# Patient Record
Sex: MALE | Race: OTHER | Employment: UNEMPLOYED | URBAN - NONMETROPOLITAN AREA
[De-identification: names, ages, dates, MRNs, and addresses within clinical notes are randomized per-mention and may not be internally consistent; named-entity substitution may affect disease eponyms.]

---
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
## 2022-07-14 ENCOUNTER — OFFICE VISIT (OUTPATIENT)
Dept: PRIMARY CARE CLINIC | Age: 22
End: 2022-07-14
Payer: COMMERCIAL

## 2022-07-14 ENCOUNTER — HOSPITAL ENCOUNTER (OUTPATIENT)
Dept: LAB | Age: 22
Discharge: HOME | End: 2022-07-14
Payer: COMMERCIAL

## 2022-07-14 VITALS
DIASTOLIC BLOOD PRESSURE: 64 MMHG | WEIGHT: 129 LBS | OXYGEN SATURATION: 98 % | TEMPERATURE: 98.2 F | HEART RATE: 74 BPM | SYSTOLIC BLOOD PRESSURE: 110 MMHG

## 2022-07-14 DIAGNOSIS — R19.7 DIARRHEA, UNSPECIFIED TYPE: ICD-10-CM

## 2022-07-14 DIAGNOSIS — R10.30 LOWER ABDOMINAL PAIN: Primary | ICD-10-CM

## 2022-07-14 DIAGNOSIS — R10.30 LOWER ABDOMINAL PAIN: ICD-10-CM

## 2022-07-14 LAB
-: ABNORMAL
ABSOLUTE EOS #: 0.07 K/UL (ref 0–0.44)
ABSOLUTE IMMATURE GRANULOCYTE: <0.03 K/UL (ref 0–0.3)
ABSOLUTE LYMPH #: 1.81 K/UL (ref 1.2–5.2)
ABSOLUTE MONO #: 0.69 K/UL (ref 0.1–1.4)
ALBUMIN SERPL-MCNC: 4.8 G/DL (ref 3.5–5.2)
ALBUMIN/GLOBULIN RATIO: 1.5 (ref 1–2.5)
ALP BLD-CCNC: 64 U/L (ref 40–129)
ALT SERPL-CCNC: 9 U/L (ref 5–41)
ANION GAP SERPL CALCULATED.3IONS-SCNC: 11 MMOL/L (ref 9–17)
AST SERPL-CCNC: 20 U/L
BACTERIA: ABNORMAL
BASOPHILS # BLD: 0 % (ref 0–2)
BASOPHILS ABSOLUTE: 0.03 K/UL (ref 0–0.2)
BILIRUB SERPL-MCNC: 0.76 MG/DL (ref 0.3–1.2)
BILIRUBIN URINE: NEGATIVE
BUN BLDV-MCNC: 12 MG/DL (ref 6–20)
BUN/CREAT BLD: 15 (ref 9–20)
CALCIUM SERPL-MCNC: 10 MG/DL (ref 8.6–10.4)
CHLORIDE BLD-SCNC: 99 MMOL/L (ref 98–107)
CO2: 27 MMOL/L (ref 20–31)
CREAT SERPL-MCNC: 0.81 MG/DL (ref 0.7–1.2)
EOSINOPHILS RELATIVE PERCENT: 1 % (ref 1–4)
EPITHELIAL CELLS UA: ABNORMAL /HPF (ref 0–5)
GFR AFRICAN AMERICAN: >60 ML/MIN
GFR NON-AFRICAN AMERICAN: >60 ML/MIN
GFR SERPL CREATININE-BSD FRML MDRD: ABNORMAL ML/MIN/{1.73_M2}
GLUCOSE BLD-MCNC: 106 MG/DL (ref 70–99)
GLUCOSE URINE: NEGATIVE
HCT VFR BLD CALC: 47.8 % (ref 40.7–50.3)
HEMOGLOBIN: 15.7 G/DL (ref 13–17)
IMMATURE GRANULOCYTES: 0 %
KETONES, URINE: NEGATIVE
LEUKOCYTE ESTERASE, URINE: NEGATIVE
LYMPHOCYTES # BLD: 22 % (ref 25–45)
MCH RBC QN AUTO: 27.7 PG (ref 25.2–33.5)
MCHC RBC AUTO-ENTMCNC: 32.8 G/DL (ref 25.2–33.5)
MCV RBC AUTO: 84.3 FL (ref 82.6–102.9)
MONOCYTES # BLD: 8 % (ref 2–8)
MUCUS: ABNORMAL
NITRITE, URINE: NEGATIVE
NRBC AUTOMATED: 0 PER 100 WBC
PDW BLD-RTO: 13.1 % (ref 11.8–14.4)
PH UA: 7 (ref 5–6)
PLATELET # BLD: 233 K/UL (ref 138–453)
PMV BLD AUTO: 9.9 FL (ref 8.1–13.5)
POTASSIUM SERPL-SCNC: 4 MMOL/L (ref 3.7–5.3)
PROTEIN UA: NEGATIVE
RBC # BLD: 5.67 M/UL (ref 4.21–5.77)
RBC UA: ABNORMAL /HPF (ref 0–4)
SEG NEUTROPHILS: 69 % (ref 34–64)
SEGMENTED NEUTROPHILS ABSOLUTE COUNT: 5.81 K/UL (ref 1.8–8)
SODIUM BLD-SCNC: 137 MMOL/L (ref 135–144)
SPECIFIC GRAVITY UA: 1.02 (ref 1.01–1.02)
TOTAL PROTEIN: 8.1 G/DL (ref 6.4–8.3)
URINE HGB: NEGATIVE
UROBILINOGEN, URINE: NORMAL
WBC # BLD: 8.4 K/UL (ref 4.5–13.5)
WBC UA: ABNORMAL /HPF (ref 0–4)

## 2022-07-14 PROCEDURE — 81001 URINALYSIS AUTO W/SCOPE: CPT

## 2022-07-14 PROCEDURE — 99203 OFFICE O/P NEW LOW 30 MIN: CPT | Performed by: FAMILY MEDICINE

## 2022-07-14 PROCEDURE — 99204 OFFICE O/P NEW MOD 45 MIN: CPT | Performed by: FAMILY MEDICINE

## 2022-07-14 PROCEDURE — 36415 COLL VENOUS BLD VENIPUNCTURE: CPT

## 2022-07-14 PROCEDURE — G8427 DOCREV CUR MEDS BY ELIG CLIN: HCPCS | Performed by: FAMILY MEDICINE

## 2022-07-14 PROCEDURE — 85025 COMPLETE CBC W/AUTO DIFF WBC: CPT

## 2022-07-14 PROCEDURE — 80053 COMPREHEN METABOLIC PANEL: CPT

## 2022-07-14 PROCEDURE — G8421 BMI NOT CALCULATED: HCPCS | Performed by: FAMILY MEDICINE

## 2022-07-14 PROCEDURE — 1036F TOBACCO NON-USER: CPT | Performed by: FAMILY MEDICINE

## 2022-07-14 ASSESSMENT — PATIENT HEALTH QUESTIONNAIRE - PHQ9
SUM OF ALL RESPONSES TO PHQ QUESTIONS 1-9: 0
2. FEELING DOWN, DEPRESSED OR HOPELESS: 0
SUM OF ALL RESPONSES TO PHQ QUESTIONS 1-9: 0
1. LITTLE INTEREST OR PLEASURE IN DOING THINGS: 0
SUM OF ALL RESPONSES TO PHQ QUESTIONS 1-9: 0
SUM OF ALL RESPONSES TO PHQ QUESTIONS 1-9: 0
SUM OF ALL RESPONSES TO PHQ9 QUESTIONS 1 & 2: 0

## 2022-07-14 NOTE — PROGRESS NOTES
[facility]             [address] [facility], [address]                        Telephone [phone]             Fax [phone]       Chace Watt  :  2000  Age:  22 y.o. MRN:  [id number]  Date of visit:  2022       Assessment and Plan:    1. Lower abdominal pain  2. Diarrhea, unspecified type  I reviewed the results of the labs done today were reviewed with the patient.    - Gastrointestinal Panel, Molecular; Future was also ordered. He was also referred to general surgery:  - Ambulatory referral to General Surgery      Printed information regarding Abdominal Pain was provided to the patient with the after visit summary. He was advised to drink plenty of fluids and eat a bland diet. He was advised to follow up if symptoms worsen or do not resolve. Subjective:    Chace Watt is a 22 y.o. male who presents to [facility] today (2022) for evaluation of:  Abdominal Pain (off and on for 1 1/2 months nausea vomitting diarrhea with it)      He reports that he has had intermittent episodes of pain in the lower abdomen for approximately 1-1/2 months. He states that this has been occurring a couple of times a week. He reports nausea with these episodes. He states that he sometimes has diarrhea with these episodes. He reports sweating during the episodes. He states that his appetite is generally decreased on the days that he has the pain. He rates that pain at a 6 on a 0-10 scale. He denies pain currently. He denies blood in bowel movements. He denies urinary symptoms. He is not aware of any weight loss. He has no significant past medical history. He has the following surgical history:  Past Surgical History:   Procedure Laterality Date    EYE SURGERY      infant        He does not take any prescription medications currently.           He is allergic to pcn [penicillins]. He  reports that he is a non-smoker but has been exposed to tobacco smoke. He has never used smokeless tobacco..      Objective:    Vitals:    07/14/22 1218   BP: 110/64   Site: Left Upper Arm   Position: Sitting   Cuff Size: Large Adult   Pulse: 74   Temp: 98.2 °F (36.8 °C)   TempSrc: Tympanic   SpO2: 98%   Weight: 129 lb (58.5 kg)     There is no height or weight on file to calculate BMI. Well-nourished, well-developed male, healthy-appearing, alert, cooperative and in no acute distress. Mucus membranes are moist.  Neck is supple. There is no lymphadenopathy or thyromegaly. Chest is clear to auscultation bilaterally. Heart sounds are regular rate and rhythm without murmur. Abdomen is soft, nondistended. There are no masses. There is no tenderness to palpation.     Results of the testing done today were reviewed with the patient:  Hospital Outpatient Visit on 07/14/2022   Component Date Value Ref Range Status    Glucose 07/14/2022 106* 70 - 99 mg/dL Final    BUN 07/14/2022 12  6 - 20 mg/dL Final    CREATININE 07/14/2022 0.81  0.70 - 1.20 mg/dL Final    Bun/Cre Ratio 07/14/2022 15  9 - 20 Final    Calcium 07/14/2022 10.0  8.6 - 10.4 mg/dL Final    Sodium 07/14/2022 137  135 - 144 mmol/L Final    Potassium 07/14/2022 4.0  3.7 - 5.3 mmol/L Final    Chloride 07/14/2022 99  98 - 107 mmol/L Final    CO2 07/14/2022 27  20 - 31 mmol/L Final    Anion Gap 07/14/2022 11  9 - 17 mmol/L Final    Alkaline Phosphatase 07/14/2022 64  40 - 129 U/L Final    ALT 07/14/2022 9  5 - 41 U/L Final    AST 07/14/2022 20  <40 U/L Final    Total Bilirubin 07/14/2022 0.76  0.3 - 1.2 mg/dL Final    Total Protein 07/14/2022 8.1  6.4 - 8.3 g/dL Final    Albumin 07/14/2022 4.8  3.5 - 5.2 g/dL Final    Albumin/Globulin Ratio 07/14/2022 1.5  1.0 - 2.5 Final    GFR Non- 07/14/2022 >60  >60 mL/min Final    GFR  07/14/2022 >60  >60 mL/min Final    GFR Comment 07/14/2022 Final    Comment: Average GFR for 20-28 years old:   116 mL/min/1.73sq m  Chronic Kidney Disease:   <60 mL/min/1.73sq m  Kidney failure:   <15 mL/min/1.73sq m              eGFR calculated using average adult body mass.  Additional eGFR calculator available at:        Picklive.br            WBC 07/14/2022 8.4  4.5 - 13.5 k/uL Final    RBC 07/14/2022 5.67  4.21 - 5.77 m/uL Final    Hemoglobin 07/14/2022 15.7  13.0 - 17.0 g/dL Final    Hematocrit 07/14/2022 47.8  40.7 - 50.3 % Final    MCV 07/14/2022 84.3  82.6 - 102.9 fL Final    MCH 07/14/2022 27.7  25.2 - 33.5 pg Final    MCHC 07/14/2022 32.8  25.2 - 33.5 g/dL Final    RDW 07/14/2022 13.1  11.8 - 14.4 % Final    Platelets 09/80/5842 233  138 - 453 k/uL Final    MPV 07/14/2022 9.9  8.1 - 13.5 fL Final    NRBC Automated 07/14/2022 0.0  0.0 per 100 WBC Final    Seg Neutrophils 07/14/2022 69* 34 - 64 % Final    Lymphocytes 07/14/2022 22* 25 - 45 % Final    Monocytes 07/14/2022 8  2 - 8 % Final    Eosinophils % 07/14/2022 1  1 - 4 % Final    Basophils 07/14/2022 0  0 - 2 % Final    Immature Granulocytes 07/14/2022 0  0 % Final    Segs Absolute 07/14/2022 5.81  1.80 - 8.00 k/uL Final    Absolute Lymph # 07/14/2022 1.81  1.20 - 5.20 k/uL Final    Absolute Mono # 07/14/2022 0.69  0.10 - 1.40 k/uL Final    Absolute Eos # 07/14/2022 0.07  0.00 - 0.44 k/uL Final    Basophils Absolute 07/14/2022 0.03  0.00 - 0.20 k/uL Final    Absolute Immature Granulocyte 07/14/2022 <0.03  0.00 - 0.30 k/uL Final    Glucose, Ur 07/14/2022 NEGATIVE  NEGATIVE Final    Bilirubin Urine 07/14/2022 NEGATIVE  NEGATIVE Final    Ketones, Urine 07/14/2022 NEGATIVE  NEGATIVE Final    Specific Gravity, UA 07/14/2022 1.020  1.010 - 1.025 Final    Urine Hgb 07/14/2022 NEGATIVE  NEGATIVE Final    pH, UA 07/14/2022 7.0* 5.0 - 6.0 Final    Protein, UA 07/14/2022 NEGATIVE  NEGATIVE Final    Urobilinogen, Urine 07/14/2022 Normal  Normal Final  Nitrite, Urine 07/14/2022 NEGATIVE  NEGATIVE Final    Leukocyte Esterase, Urine 07/14/2022 NEGATIVE  NEGATIVE Final    - 07/14/2022        Final    WBC, UA 07/14/2022 0 TO 4  0 - 4 /HPF Final    RBC, UA 07/14/2022 0 TO 4  0 - 4 /HPF Final    Epithelial Cells UA 07/14/2022 0 TO 4  0 - 5 /HPF Final    Bacteria, UA 07/14/2022 TRACE* None Final    Mucus, UA 07/14/2022 2+* None Final         (Please note that portions of this note were completed with a voice-recognition program. Efforts were made to edit the dictation but occasionally words are mis-transcribed.)

## 2022-07-14 NOTE — Clinical Note
[address] [facility] A department of [place] [address]  Phone: [phone]  Fax: [phone]    Marie Ochoa MD        July 14, 2022     Patient: Vladislav Weaver   YOB: 2000   Date of Visit: 7/14/2022       To Whom It May Concern: It is my medical opinion that Vladislav Weaver {Work release (duty restriction):98181}. If you have any questions or concerns, please don't hesitate to call.     Sincerely,        Marie Ochoa MD

## 2022-07-14 NOTE — PATIENT INSTRUCTIONS
Patient Education        Abdominal Pain: Care Instructions  Your Care Instructions     Abdominal pain has many possible causes. Some aren't serious and get better on their own in a few days. Others need more testing and treatment. If your pain continues or gets worse, you need to be rechecked and may need more tests tofind out what is wrong. You may need surgery to correct the problem. Don't ignore new symptoms, such as fever, nausea and vomiting, urination problems, pain that gets worse, and dizziness. These may be signs of a moreserious problem. Your doctor may have recommended a follow-up visit in the next 8 to 12 hours. If you are not getting better, you may need more tests or treatment. The doctor has checked you carefully, but problems can develop later. If you notice any problems or new symptoms, get medical treatment right away. Follow-up care is a key part of your treatment and safety. Be sure to make and go to all appointments, and call your doctor if you are having problems. It's also a good idea to know your test results and keep alist of the medicines you take. How can you care for yourself at home?  Rest until you feel better.  To prevent dehydration, drink plenty of fluids. Choose water and other clear liquids until you feel better. If you have kidney, heart, or liver disease and have to limit fluids, talk with your doctor before you increase the amount of fluids you drink.  If your stomach is upset, eat mild foods, such as rice, dry toast or crackers, bananas, and applesauce. Try eating several small meals instead of two or three large ones.  Wait until 48 hours after all symptoms have gone away before you have spicy foods, alcohol, and drinks that contain caffeine.  Do not eat foods that are high in fat.  Avoid anti-inflammatory medicines such as aspirin, ibuprofen (Advil, Motrin), and naproxen (Aleve). These can cause stomach upset.  Talk to your doctor if you take daily aspirin for another health problem. When should you call for help? Call 911 anytime you think you may need emergency care. For example, call if:     You passed out (lost consciousness).      You pass maroon or very bloody stools.      You vomit blood or what looks like coffee grounds.      You have new, severe belly pain. Call your doctor now or seek immediate medical care if:     Your pain gets worse, especially if it becomes focused in one area of your belly.      You have a new or higher fever.      Your stools are black and look like tar, or they have streaks of blood.      You have unexpected vaginal bleeding.      You have symptoms of a urinary tract infection. These may include:  ? Pain when you urinate. ? Urinating more often than usual.  ? Blood in your urine.      You are dizzy or lightheaded, or you feel like you may faint. Watch closely for changes in your health, and be sure to contact your doctor if:     You are not getting better after 1 day (24 hours). Where can you learn more? Go to https://Medical Joyworks.Affine. org and sign in to your RobotDough Software account. Enter L561 in the Dindong box to learn more about \"Abdominal Pain: Care Instructions. \"     If you do not have an account, please click on the \"Sign Up Now\" link. Current as of: March 9, 2022               Content Version: 13.3  © 5449-2541 Healthwise, Incorporated. Care instructions adapted under license by [facility] ([facility]). If you have questions about a medical condition or this instruction, always ask your healthcare professional. [place] [address] any warranty or liability for your use of this information.

## 2022-07-14 NOTE — Clinical Note
[address] [facility] A department of [place] [address]  Phone: [phone]  Fax: [phone]    Deborah Shannon MD        July 14, 2022     Patient: Abran Joseph   YOB: 2000   Date of Visit: 7/14/2022       To Whom it May Concern:    Abran Joseph was seen in my clinic on 7/14/2022. He {Return to school/sport/work:39804}. If you have any questions or concerns, please don't hesitate to call.     Sincerely,         Deborah Shannon MD

## 2023-11-15 ENCOUNTER — HOSPITAL ENCOUNTER (EMERGENCY)
Age: 23
Discharge: HOME | End: 2023-11-15
Attending: EMERGENCY MEDICINE
Payer: COMMERCIAL

## 2023-11-15 ENCOUNTER — APPOINTMENT (OUTPATIENT)
Dept: GENERAL RADIOLOGY | Age: 23
End: 2023-11-15
Payer: COMMERCIAL

## 2023-11-15 VITALS
RESPIRATION RATE: 18 BRPM | BODY MASS INDEX: 21.97 KG/M2 | WEIGHT: 140 LBS | TEMPERATURE: 98.3 F | DIASTOLIC BLOOD PRESSURE: 92 MMHG | SYSTOLIC BLOOD PRESSURE: 149 MMHG | OXYGEN SATURATION: 100 % | HEIGHT: 67 IN | HEART RATE: 140 BPM

## 2023-11-15 DIAGNOSIS — E87.6 HYPOKALEMIA: ICD-10-CM

## 2023-11-15 DIAGNOSIS — R00.0 TACHYCARDIA: Primary | ICD-10-CM

## 2023-11-15 DIAGNOSIS — E86.0 DEHYDRATION: ICD-10-CM

## 2023-11-15 LAB
ANION GAP SERPL CALCULATED.3IONS-SCNC: 14 MMOL/L (ref 9–17)
BASOPHILS # BLD: 0.08 K/UL (ref 0–0.2)
BASOPHILS NFR BLD: 1 % (ref 0–2)
BUN SERPL-MCNC: 9 MG/DL (ref 6–20)
BUN/CREAT SERPL: 11 (ref 9–20)
CALCIUM SERPL-MCNC: 8.9 MG/DL (ref 8.6–10.4)
CHLORIDE SERPL-SCNC: 101 MMOL/L (ref 98–107)
CO2 SERPL-SCNC: 24 MMOL/L (ref 20–31)
CREAT SERPL-MCNC: 0.8 MG/DL (ref 0.7–1.2)
EOSINOPHIL # BLD: 0.15 K/UL (ref 0–0.44)
EOSINOPHILS RELATIVE PERCENT: 1 % (ref 1–4)
ERYTHROCYTE [DISTWIDTH] IN BLOOD BY AUTOMATED COUNT: 13.2 % (ref 11.8–14.4)
GFR SERPL CREATININE-BSD FRML MDRD: >60 ML/MIN/1.73M2
GLUCOSE SERPL-MCNC: 138 MG/DL (ref 70–99)
HCT VFR BLD AUTO: 43 % (ref 40.7–50.3)
HGB BLD-MCNC: 14.5 G/DL (ref 13–17)
IMM GRANULOCYTES # BLD AUTO: 0.03 K/UL (ref 0–0.3)
IMM GRANULOCYTES NFR BLD: 0 %
LYMPHOCYTES NFR BLD: 3.45 K/UL (ref 1.1–3.7)
LYMPHOCYTES RELATIVE PERCENT: 30 % (ref 24–43)
MCH RBC QN AUTO: 27.6 PG (ref 25.2–33.5)
MCHC RBC AUTO-ENTMCNC: 33.7 G/DL (ref 25.2–33.5)
MCV RBC AUTO: 81.7 FL (ref 82.6–102.9)
MONOCYTES NFR BLD: 1.08 K/UL (ref 0.1–1.2)
MONOCYTES NFR BLD: 9 % (ref 3–12)
NEUTROPHILS NFR BLD: 59 % (ref 36–65)
NEUTS SEG NFR BLD: 6.86 K/UL (ref 1.5–8.1)
NRBC BLD-RTO: 0 PER 100 WBC
PLATELET # BLD AUTO: 309 K/UL (ref 138–453)
PMV BLD AUTO: 9.7 FL (ref 8.1–13.5)
POTASSIUM SERPL-SCNC: 3.1 MMOL/L (ref 3.7–5.3)
RBC # BLD AUTO: 5.26 M/UL (ref 4.21–5.77)
RBC # BLD: ABNORMAL 10*6/UL
SODIUM SERPL-SCNC: 139 MMOL/L (ref 135–144)
TROPONIN I SERPL HS-MCNC: <6 NG/L (ref 0–22)
WBC OTHER # BLD: 11.7 K/UL (ref 3.5–11.3)

## 2023-11-15 PROCEDURE — 6370000000 HC RX 637 (ALT 250 FOR IP): Performed by: EMERGENCY MEDICINE

## 2023-11-15 PROCEDURE — 84484 ASSAY OF TROPONIN QUANT: CPT

## 2023-11-15 PROCEDURE — 80048 BASIC METABOLIC PNL TOTAL CA: CPT

## 2023-11-15 PROCEDURE — 36415 COLL VENOUS BLD VENIPUNCTURE: CPT

## 2023-11-15 PROCEDURE — 96360 HYDRATION IV INFUSION INIT: CPT

## 2023-11-15 PROCEDURE — 71045 X-RAY EXAM CHEST 1 VIEW: CPT

## 2023-11-15 PROCEDURE — 85025 COMPLETE CBC W/AUTO DIFF WBC: CPT

## 2023-11-15 PROCEDURE — 93005 ELECTROCARDIOGRAM TRACING: CPT | Performed by: EMERGENCY MEDICINE

## 2023-11-15 PROCEDURE — 2580000003 HC RX 258: Performed by: EMERGENCY MEDICINE

## 2023-11-15 PROCEDURE — 99285 EMERGENCY DEPT VISIT HI MDM: CPT

## 2023-11-15 RX ORDER — 0.9 % SODIUM CHLORIDE 0.9 %
1000 INTRAVENOUS SOLUTION INTRAVENOUS ONCE
Status: COMPLETED | OUTPATIENT
Start: 2023-11-15 | End: 2023-11-15

## 2023-11-15 RX ADMIN — SODIUM CHLORIDE 1000 ML: 9 INJECTION, SOLUTION INTRAVENOUS at 18:19

## 2023-11-15 RX ADMIN — POTASSIUM BICARBONATE 40 MEQ: 782 TABLET, EFFERVESCENT ORAL at 19:24

## 2023-11-15 ASSESSMENT — PAIN - FUNCTIONAL ASSESSMENT: PAIN_FUNCTIONAL_ASSESSMENT: NONE - DENIES PAIN

## 2023-11-15 NOTE — ED PROVIDER NOTES
[place]      Pt Name: Ronnie Mohan  MRN: [id number]  [address] 2000  Date of evaluation: 11/15/2023      CHIEF COMPLAINT       Chief Complaint   Patient presents with    Shortness of Breath     Smoked marijuana 1 hour pta, feels like heart is racing         HISTORY OF PRESENT ILLNESS      The patient presents with shortness of breath. He smoked marijuana today and started having his heart race. He smokes marijuana daily. He says he is always getting it from the same person so he does not think it would likely be laced with any other drugs. He is not having chest pain. He did vomit today. He denies fever. He denies hemoptysis. He denies trauma. Nothing makes his symptoms better or worse otherwise. REVIEW OF SYSTEMS       All systems reviewed and negative unless noted in HPI. The patient denies fever or constitutional symptoms. Denies vision change. Denies any sore throat or rhinorrhea. Denies any neck pain or stiffness. Denies chest pain but notes heart is racing. Reports shortness of breath. Vomiting today. Denies any dysuria. Denies urinary frequency or hematuria. Denies musculoskeletal injury or pain. Denies any weakness, numbness or focal neurologic deficit. Denies any skin rash or edema. Uses marijuana daily. No easy bruising or bleeding. Denies any polyuria, polydypsia or history of immunocompromise. PAST MEDICAL HISTORY    has a past medical history of Left wrist injury. SURGICAL HISTORY      has a past surgical history that includes Eye surgery. CURRENT MEDICATIONS       Previous Medications    No medications on file       ALLERGIES     is allergic to pcn [penicillins]. FAMILY HISTORY     He indicated that his mother is alive. He indicated that his father is . family history is not on file. SOCIAL HISTORY      reports that he has never smoked. He has been exposed to tobacco smoke.  He

## 2023-11-16 NOTE — DISCHARGE INSTRUCTIONS
Drink plenty of fluids and get extra rest.  Eat foods that have potassium in it. Avoid using marijuana. Return for rapid heart rate, chest pain, difficulty breathing, or if worse in any way. Please understand that at this time there is no evidence for a more serious underlying process, but that early in the process of an illness or injury, an emergency department workup can be falsely reassuring. You should contact your family doctor within the next 48 hours for a follow up appointment    [address]!!!    From [facility]) and [facility]    On behalf of the Emergency Department staff at [facility]), I would like to thank you for giving us the opportunity to address your health care needs and concerns. We hope that during your visit, our service was delivered in a professional and caring manner. Please keep [facility]) in mind as we walk with you down the path to your own personal wellness. Please expect an automated text message or email from us so we can ask a few questions about your health and progress. Based on your answers, a clinician may call you back to offer help and instructions. Please understand that early in the process of an illness or injury, an emergency department workup can be falsely reassuring. If you notice any worsening, changing or persistent symptoms please call your family doctor or return to the ER immediately. Tell us how we did during your visit at [email or web address]   and let us know about your experience

## 2023-11-18 LAB
EKG ATRIAL RATE: 136 BPM
EKG P AXIS: 71 DEGREES
EKG P-R INTERVAL: 156 MS
EKG Q-T INTERVAL: 280 MS
EKG QRS DURATION: 88 MS
EKG QTC CALCULATION (BAZETT): 420 MS
EKG R AXIS: 101 DEGREES
EKG T AXIS: 74 DEGREES
EKG VENTRICULAR RATE: 135 BPM

## 2024-05-24 ENCOUNTER — OFFICE VISIT (OUTPATIENT)
Dept: PRIMARY CARE CLINIC | Age: 24
End: 2024-05-24
Payer: COMMERCIAL

## 2024-05-24 VITALS
SYSTOLIC BLOOD PRESSURE: 90 MMHG | OXYGEN SATURATION: 98 % | WEIGHT: 136.4 LBS | TEMPERATURE: 98.3 F | DIASTOLIC BLOOD PRESSURE: 70 MMHG | HEART RATE: 65 BPM | BODY MASS INDEX: 21.41 KG/M2 | HEIGHT: 67 IN

## 2024-05-24 DIAGNOSIS — L23.9 ALLERGIC CONTACT DERMATITIS, UNSPECIFIED TRIGGER: Primary | ICD-10-CM

## 2024-05-24 PROCEDURE — G8420 CALC BMI NORM PARAMETERS: HCPCS | Performed by: NURSE PRACTITIONER

## 2024-05-24 PROCEDURE — 99213 OFFICE O/P EST LOW 20 MIN: CPT | Performed by: NURSE PRACTITIONER

## 2024-05-24 PROCEDURE — 1036F TOBACCO NON-USER: CPT | Performed by: NURSE PRACTITIONER

## 2024-05-24 PROCEDURE — G8427 DOCREV CUR MEDS BY ELIG CLIN: HCPCS | Performed by: NURSE PRACTITIONER

## 2024-05-24 RX ORDER — TRIAMCINOLONE ACETONIDE 0.25 MG/G
CREAM TOPICAL
Qty: 80 G | Refills: 0 | Status: SHIPPED | OUTPATIENT
Start: 2024-05-24

## 2024-05-24 SDOH — ECONOMIC STABILITY: HOUSING INSECURITY
IN THE LAST 12 MONTHS, WAS THERE A TIME WHEN YOU DID NOT HAVE A STEADY PLACE TO SLEEP OR SLEPT IN A SHELTER (INCLUDING NOW)?: NO

## 2024-05-24 SDOH — ECONOMIC STABILITY: FOOD INSECURITY: WITHIN THE PAST 12 MONTHS, YOU WORRIED THAT YOUR FOOD WOULD RUN OUT BEFORE YOU GOT MONEY TO BUY MORE.: NEVER TRUE

## 2024-05-24 SDOH — ECONOMIC STABILITY: FOOD INSECURITY: WITHIN THE PAST 12 MONTHS, THE FOOD YOU BOUGHT JUST DIDN'T LAST AND YOU DIDN'T HAVE MONEY TO GET MORE.: NEVER TRUE

## 2024-05-24 SDOH — ECONOMIC STABILITY: INCOME INSECURITY: HOW HARD IS IT FOR YOU TO PAY FOR THE VERY BASICS LIKE FOOD, HOUSING, MEDICAL CARE, AND HEATING?: NOT HARD AT ALL

## 2024-05-24 ASSESSMENT — ENCOUNTER SYMPTOMS
NAUSEA: 0
CONSTIPATION: 0
COUGH: 0
SHORTNESS OF BREATH: 0
WHEEZING: 0
ABDOMINAL PAIN: 0
VOMITING: 0
CHEST TIGHTNESS: 0
ABDOMINAL DISTENTION: 0
DIARRHEA: 0
COLOR CHANGE: 0

## 2024-05-24 ASSESSMENT — PATIENT HEALTH QUESTIONNAIRE - PHQ9
1. LITTLE INTEREST OR PLEASURE IN DOING THINGS: NOT AT ALL
SUM OF ALL RESPONSES TO PHQ QUESTIONS 1-9: 0
SUM OF ALL RESPONSES TO PHQ QUESTIONS 1-9: 0
2. FEELING DOWN, DEPRESSED OR HOPELESS: NOT AT ALL
SUM OF ALL RESPONSES TO PHQ QUESTIONS 1-9: 0
SUM OF ALL RESPONSES TO PHQ9 QUESTIONS 1 & 2: 0
SUM OF ALL RESPONSES TO PHQ QUESTIONS 1-9: 0

## 2024-05-24 NOTE — PROGRESS NOTES
vaccine (2 of 2 - 2-dose series) 03/20/2008    HPV vaccine (1 - Male 2-dose series) Never done    DTaP/Tdap/Td vaccine (6 - Tdap) 09/18/2012    HIV screen  Never done    Hepatitis C screen  Never done    Depression Screen  07/14/2023    Flu vaccine (Season Ended) 08/01/2024    Hepatitis B vaccine  Completed    Hib vaccine  Completed    Polio vaccine  Completed    Varicella vaccine  Completed    Meningococcal (ACWY) vaccine  Aged Out    Pneumococcal 0-64 years Vaccine  Aged Out    Measles,Mumps,Rubella (MMR) vaccine  Discontinued       Subjective:     Review of Systems   Constitutional:  Negative for activity change, appetite change, chills, diaphoresis, fatigue, fever and unexpected weight change.   Respiratory:  Negative for cough, chest tightness, shortness of breath and wheezing.    Cardiovascular:  Negative for chest pain, palpitations and leg swelling.   Gastrointestinal:  Negative for abdominal distention, abdominal pain, constipation, diarrhea, nausea and vomiting.   Skin:  Positive for rash (with itching on left forearm). Negative for color change.   Neurological:  Negative for dizziness, weakness, light-headedness, numbness and headaches.       Objective:     Physical Exam  Vitals and nursing note reviewed.   Constitutional:       General: He is awake. He is not in acute distress.     Appearance: Normal appearance. He is well-developed and well-groomed. He is not ill-appearing, toxic-appearing or diaphoretic.   Cardiovascular:      Rate and Rhythm: Normal rate and regular rhythm.      Heart sounds: Normal heart sounds, S1 normal and S2 normal.   Pulmonary:      Effort: Pulmonary effort is normal. No respiratory distress.      Breath sounds: Normal breath sounds and air entry. No decreased air movement. No decreased breath sounds, wheezing or rhonchi.   Abdominal:      General: There is no distension.      Palpations: Abdomen is soft.      Tenderness: There is no abdominal tenderness.   Musculoskeletal:

## 2024-08-19 ENCOUNTER — OFFICE VISIT (OUTPATIENT)
Dept: PRIMARY CARE CLINIC | Age: 24
End: 2024-08-19

## 2024-08-19 ENCOUNTER — HOSPITAL ENCOUNTER (EMERGENCY)
Age: 24
Discharge: HOME | End: 2024-08-19
Attending: EMERGENCY MEDICINE
Payer: MEDICAID

## 2024-08-19 VITALS
HEIGHT: 67 IN | HEART RATE: 66 BPM | OXYGEN SATURATION: 100 % | DIASTOLIC BLOOD PRESSURE: 87 MMHG | TEMPERATURE: 98.5 F | WEIGHT: 132 LBS | RESPIRATION RATE: 18 BRPM | BODY MASS INDEX: 20.72 KG/M2 | SYSTOLIC BLOOD PRESSURE: 129 MMHG

## 2024-08-19 VITALS
RESPIRATION RATE: 17 BRPM | WEIGHT: 132.13 LBS | HEART RATE: 66 BPM | SYSTOLIC BLOOD PRESSURE: 110 MMHG | DIASTOLIC BLOOD PRESSURE: 76 MMHG | TEMPERATURE: 97.5 F | OXYGEN SATURATION: 99 % | BODY MASS INDEX: 20.74 KG/M2 | HEIGHT: 67 IN

## 2024-08-19 DIAGNOSIS — T78.3XXA ANGIOEDEMA, INITIAL ENCOUNTER: Primary | ICD-10-CM

## 2024-08-19 DIAGNOSIS — K13.0 INFECTION OF LIP: Primary | ICD-10-CM

## 2024-08-19 PROCEDURE — 99283 EMERGENCY DEPT VISIT LOW MDM: CPT

## 2024-08-19 PROCEDURE — 99213 OFFICE O/P EST LOW 20 MIN: CPT | Performed by: FAMILY MEDICINE

## 2024-08-19 RX ORDER — CLINDAMYCIN HYDROCHLORIDE 300 MG/1
300 CAPSULE ORAL 3 TIMES DAILY
Qty: 21 CAPSULE | Refills: 0 | Status: SHIPPED | OUTPATIENT
Start: 2024-08-19 | End: 2024-08-26

## 2024-08-19 ASSESSMENT — ENCOUNTER SYMPTOMS
NAUSEA: 1
COUGH: 0
WHEEZING: 0
FACIAL SWELLING: 1
CHEST TIGHTNESS: 0
SHORTNESS OF BREATH: 1
TROUBLE SWALLOWING: 1

## 2024-08-19 ASSESSMENT — LIFESTYLE VARIABLES
HOW MANY STANDARD DRINKS CONTAINING ALCOHOL DO YOU HAVE ON A TYPICAL DAY: 1 OR 2
HOW OFTEN DO YOU HAVE A DRINK CONTAINING ALCOHOL: 2-4 TIMES A MONTH

## 2024-08-19 NOTE — PROGRESS NOTES
[facility]  [address]  [facility] [address]  Dept: [phone]  Dept Fax: [phone]    Balbir Ferrari is a 23 y.o. male who presents today for his medical conditions/complaints as noted below.  Balbir Ferrari is c/o of   Chief Complaint   Patient presents with    Oral Swelling     He is here for lower lip swelling, reports having to take deep breathes due to feeling sob and he felt nausea        HPI:     HPI Here today for lip swelling.     He first noticed swelling in his lip earlier today after he saw a pimple like papule on his lower lip. He has not had any fevers. He has been a little short of breath and a little nauseated. He has had very mild trouble swallowing. He has not had any lightheadedness or dizziness. He is not drooling. No new medications, vitamins or supplements. He is not on any ACE inhibitors. He does not remember a family history of angioedema.     Past Medical History:   Diagnosis Date    Left wrist injury 07/2013          Social History     Tobacco Use    Smoking status: Never     Passive exposure: Yes    Smokeless tobacco: Never   Substance Use Topics    Alcohol use: No     Current Outpatient Medications   Medication Sig Dispense Refill    triamcinolone (KENALOG) 0.025 % cream Apply topically 2 times per day for 7 days (Patient not taking: Reported on 8/19/2024) 80 g 0     No current facility-administered medications for this visit.          Allergies   Allergen Reactions    Pcn [Penicillins]        Subjective:     Review of Systems   Constitutional:  Negative for activity change, appetite change, chills, fatigue and fever.   HENT:  Positive for facial swelling and trouble swallowing (mild).    Respiratory:  Positive for shortness of breath. Negative for cough, chest tightness and wheezing.    Cardiovascular:  Negative for chest pain, palpitations and leg

## 2024-08-19 NOTE — ED PROVIDER NOTES
[facility]  EMERGENCY DEPARTMENT ENCOUNTER      Pt Name: Balbir Ferrari  MRN: [id number]  Birthdate 2000  Date of evaluation: 2024      CHIEF COMPLAINT       Chief Complaint   Patient presents with    Oral Swelling     Edema of lower lip, noticed this morning.          HISTORY OF PRESENT ILLNESS      The patient was sent from the urgent care for swelling of the lower lip.  The patient just noticed it this morning.  He said he had a little bit of a pustule that he popped and now his lip is more swollen.  He thought it was a \"spider bite\" but does not know of a spider that bit him.  He denies dental pain.  He denies difficulty breathing or swallowing.  He told the nurses that he felt like it was hard to swallow initially but that has gotten better.  The patient reports no fever.  Nothing makes his symptoms better or worse otherwise.  He denies history of herpes.      REVIEW OF SYSTEMS       Left lower lip swelling as noted in HPI.    PAST MEDICAL HISTORY    has a past medical history of Left wrist injury.    SURGICAL HISTORY      has a past surgical history that includes Eye surgery.    CURRENT MEDICATIONS       Previous Medications    TRIAMCINOLONE (KENALOG) 0.025 % CREAM    Apply topically 2 times per day for 7 days       ALLERGIES     is allergic to pcn [penicillins].    FAMILY HISTORY     He indicated that his mother is alive. He indicated that his father is .     family history is not on file.    SOCIAL HISTORY      reports that he has never smoked. He has been exposed to tobacco smoke. He has never used smokeless tobacco. He reports current drug use. Frequency: 7.00 times per week. Drug: Marijuana (Weed). He reports that he does not drink alcohol.    PHYSICAL EXAM     INITIAL VITALS:  height is 1.702 m (5' 7\") and weight is 59.9 kg (132 lb). His temperature is 98.5 °F (36.9 °C). His blood pressure is 129/87 and his pulse is 66. His respiration is 18 and oxygen saturation is 100%.       The patient is alert and oriented, in no apparent distress.    HEENT is atraumatic.  Managing secretions well and phonating normally.  No hot potato speech.  Pupils are PERRL at 4 mm with normal extraocular motion.    Mucous membranes moist.  Mild swelling to the left lower lip.  Some blistering on the surface of the lip is noted.  No abscess formation.  Dentition is and somewhat poor repair with evidence of DKA but no obvious abscess.  No submental swelling or evidence of Ludewig's angina.  No lymphadenopathy.  Posterior pharynx shows no erythema.  Uvula is midline.  No abscess.  Neck is supple with no lymphadenopathy.  No JVD.  No meningismus.    Heart sounds regular rate and rhythm with no gallops, murmurs, or rubs.    Lungs clear, no wheezes, rales or rhonchi.    Abdomen: soft, nontender with no pain to palpation.    Musculoskeletal exam shows no evidence of trauma.  Normal distal pulses in all extremities.  Skin: no rash or edema.    Neurological exam reveals cranial nerves 2 through 12 grossly intact.  Patient has equal  and normal deep tendon reflexes.    Psychiatric: no hallucinations or suicidal ideation.   Lymphatics.:  No lymphadenopathy.       DIFFERENTIAL DIAGNOSIS/ MDM:     Differential diagnosis: Lip infection, dental infection, Geoffrey's angina, airway compromise, lymphadenopathy    The patient appears to have a mild infection of the lip at this time.  I see no evidence of airway compromise.  I will put him on clindamycin since he is allergic to penicillin.  He should return if he has any difficulty breathing or swallowing.  He is discharged in good condition.    DIAGNOSTIC RESULTS         EMERGENCY DEPARTMENT COURSE:   Vitals:    Vitals:    08/19/24 1725   BP: 129/87   Pulse: 66   Resp: 18   Temp: 98.5 °F (36.9 °C)   SpO2: 100%   Weight: 59.9 kg (132 lb)   Height: 1.702 m (5' 7\")     -------------------------  BP: 129/87, Temp: 98.5 °F (36.9 °C), Pulse: 66, Respirations: 18      Re-evaluation

## 2024-08-19 NOTE — DISCHARGE INSTRUCTIONS
May take Tylenol and Motrin as directed.  Clindamycin as directed.  Return for worsening swelling, fever, difficulty breathing or swallowing, or if worse in any way.    Please understand that at this time there is no evidence for a more serious underlying process, but that early in the process of an illness or injury, an emergency department workup can be falsely reassuring.  You should contact your family doctor within the next 48 hours for a follow up appointment    THANK YOU!!!    From [facility] and [facility]    On behalf of the Emergency Department staff at [facility], I would like to thank you for giving us the opportunity to address your health care needs and concerns.    We hope that during your visit, our service was delivered in a professional and caring manner. Please keep [facility] in mind as we walk with you down the path to your own personal wellness.     Please expect an automated text message or email from us so we can ask a few questions about your health and progress. Based on your answers, a clinician may call you back to offer help and instructions.    Please understand that early in the process of an illness or injury, an emergency department workup can be falsely reassuring.  If you notice any worsening, changing or persistent symptoms please call your family doctor or return to the ER immediately.     Tell us how we did during your visit at [email or web address]   and let us know about your experience